# Patient Record
Sex: FEMALE | URBAN - METROPOLITAN AREA
[De-identification: names, ages, dates, MRNs, and addresses within clinical notes are randomized per-mention and may not be internally consistent; named-entity substitution may affect disease eponyms.]

---

## 2018-01-01 ENCOUNTER — HOSPITAL ENCOUNTER (EMERGENCY)
Facility: MEDICAL CENTER | Age: 38
End: 2018-06-12
Attending: EMERGENCY MEDICINE

## 2018-01-01 VITALS — BODY MASS INDEX: 25.71 KG/M2 | WEIGHT: 160 LBS | HEIGHT: 66 IN

## 2018-01-01 DIAGNOSIS — Z86.69 H/O TONIC-CLONIC SEIZURES: ICD-10-CM

## 2018-01-01 DIAGNOSIS — I46.9 CARDIAC ARREST (HCC): ICD-10-CM

## 2018-01-01 LAB — GLUCOSE BLD-MCNC: 357 MG/DL (ref 65–99)

## 2018-01-01 PROCEDURE — 99285 EMERGENCY DEPT VISIT HI MDM: CPT

## 2018-01-01 PROCEDURE — 31500 INSERT EMERGENCY AIRWAY: CPT

## 2018-01-01 PROCEDURE — 82962 GLUCOSE BLOOD TEST: CPT

## 2018-01-01 PROCEDURE — 92950 HEART/LUNG RESUSCITATION CPR: CPT

## 2018-01-01 ASSESSMENT — PAIN SCALES - GENERAL: PAINLEVEL_OUTOF10: 0

## 2018-06-13 NOTE — PROGRESS NOTES
1646 Patient arrives CPR in progress. Zoll pads placed.  Asystole on monitor, pulseless. CPR continued. Emesis and blood out of LPA. Patient suctioned.   1648 1 mg Epi administered. 18g IV placed in RAC. Blood obtained.  CPR Continued.   1650-Pulse Check.  Asystole on monitor. Patient remains pulseless. CPR resumed. 20 G placed in L hand.  1652-Pulse Check. Asystole on monitor. Pulseless.  CPR resumed.    1653-Patient intubated by ERP. 1 mg Epi administered.    1654-Cardiac ultrasound by ERP shows no cardiac activity.    1655-TOD 1655 by ERP Dr. Cadet.

## 2018-06-13 NOTE — RESPIRATORY CARE
Cardiopulmonary Resuscitation/Intubation Assist    Intubation assist performed yes  Reason for intubation cardiac arrest/ LMA in place  Positive Color Change on EZCap? yes    Difficult Intubation/Number of attempts 1  Evidence of aspiration yes    Patient coded in ER. MD called code after resuscitation attempted

## 2018-06-13 NOTE — ED TRIAGE NOTES
Chief Complaint   Patient presents with   • Cardiac Arrest     BIB [facility] for witnessed cardiac arrest post seizure at 1610.  CPR in progress 40 minutes PTA with epi*3 administered PTA. LPA in place upon arrival.  IO in place.     Patient hx of seizures around the time of her period.  Family denies drug/alcohol use.

## 2018-06-13 NOTE — ED PROVIDER NOTES
ED Provider Note    Scribed for Iggy Cadet M.D. by Joseph Garay. 6/12/2018, 5:07 PM.    Primary care provider: No primary care provider on file.  Means of arrival: EMS  History obtained from: EMS and family   History limited by: none     CHIEF COMPLAINT  No chief complaint on file.    HPI  Griselda Marelia Grajeda Godoy is a 37 y.o. female who presents to the Emergency Department after a seizure and loss of consciousness. According to the patients  he received a phone call at 1600 that his wife had a seizure from family and when he got home approximately 10 minutes later he began chest compressions and called EMS. EMS reported that they were on scene about 8 minutes later at 1618. En route they administered 3 rounds of epi and 2 narcaine and the patient remained unresponsive in asystole. En route her sugar was reported to be 354 and it was reported that she had no allergies and does not drink alcohol or use recreational drugs. Chest compressions were continued until EMS arrived at 1643 with the patient still in asystole (43 minutes at this point).     REVIEW OF SYSTEMS  Review of Systems   Unable to perform ROS: Patient unresponsive     PAST MEDICAL HISTORY   seizures,     SURGICAL HISTORY  patient denies any surgical history    SOCIAL HISTORY  Social History   Substance Use Topics   • Smoking status: Not on file   • Smokeless tobacco: Not on file   • Alcohol use Not on file      History   Drug use: Unknown       FAMILY HISTORY  No family history on file.    CURRENT MEDICATIONS  No current facility-administered medications on file prior to encounter.      No current outpatient prescriptions on file prior to encounter.     ALLERGIES  Allergies not on file    PHYSICAL EXAM  VITAL SIGNS: There were no vitals taken for this visit.    Constitutional: Obtunded, CPR in progress    HENT: Normocephalic, Atraumatic, Bilateral external ears normal, oropharynx moist, No oral exudates, Nose normal.   Eyes:  Pupils are fixed at 2 mm nonreactive  Neck: Trachea is midline  Lymphatic: No lymphadenopathy noted.   Cardiovascular: No heart tones are heard.  No pulses felt.  Thorax & Lungs: Patient is obtunded rhonchorous sounds throughout after intubation do have good breath sounds but still rhonchorous.  No signs of trauma   abdomen: Soft no signs of trauma  Skin: Cool cyanotic  Musculoskeletal: Flaccid no clubbing or edema  Neurologic: Obtunded flaccid  Psychiatric: Patient is obtunded    COURSE & MEDICAL DECISION MAKING  Pertinent Labs & Imaging studies reviewed. (See chart for details)    4:45 PM - Patient seen and examined at bedside.  CPR is in progress and continued by myself.    4:49 PM Pulse check asystole.  Epinephrine given.  Airway was then suctioned and resecured with 7.5 ET tube    4:51 PM Pulse check asystole.  Epinephrine given    4:53 Pulse check asystole.  Epinephrine given    4:56 pulse check, asystole.  Bedside ultrasonography shows no cardiac wall motion cease of efforts     Decision Making:  Patient presents initially there is an LMA in place there is a significant amount of vomitus coming from outside of the L and a expressed relief valve.  I did remove the LMA while CPR is in progress and continued with ACLS protocol as described above.  Patient remained asystolic but I did remove the LMA airway there is a significant amount of food material within the posterior oropharynx as well as around the vocal cords.  This was suctioned out and I used a 7.5 ET tube under direct visualization placed in the area.  Continued CPR and and respiratory effort with bag valve through endotracheal intubation.  2 more doses of epinephrine were given the patient remained asystolic.  At this point bedside ultrasonography should absolutely no cardiac wall motion and I feel it at 1656 there is no signs of life and I called the code.  I then spoke with the family about this and obtain some additional details apparently the  "patient has been having seizures but there has been apparently no medications no treatment for it sounds \"hormonal seizures whenever she has her menstrual cycle she will then have a seizure.  I do not know what the workup was in the past.  But apparently the patient has been on no antiepileptic medications.  At this time I do not have a direct cause for the patient's cardiac arrest it is possible the patient may have had a seizure than an obstructed airway and was not recognized for approximately 10 minutes when the  was called he apparently came back to the wife she is on the floor cyanotic he started immediately CPR but apparently the patient had been laying there for well over 10 minutes.  Upon arrival the ambulance the patient was asystolic in no pulses they did do 3 rounds of epinephrine and brought the patient to the ED total down time from the time of initial call was well over 45 minutes.        FINAL IMPRESSION  1. Cardiac arrest (HCC)    2. H/O tonic-clonic seizures          IJoseph (Ananyaibe), am scribing for, and in the presence of, Iggy Cadet M.D..    Electronically signed by: Joseph Garay (Abi), 6/12/2018    IIggy M.D. personally performed the services described in this documentation, as scribed by Joseph Garay in my presence, and it is both accurate and complete.    The note accurately reflects work and decisions made by me.  Iggy Cadet  6/12/2018  11:18 PM      "

## 2018-06-13 NOTE — DISCHARGE PLANNING
SW responded to Cardiac Arrest call.    Pt arrived 37 year old female -Griselda Godoy    Family arrived - Yuniel  [phone] significant other and her sister Gillian        Pt TOD [id number]  Emotional support given to family. Yuniel states that he and Pt have 2 boys 10 and 12 years old.  Child Life Specialist Eliza came and spoke to family regarding speaking to children about the death.  Resources given to family.  Difficult time Brochure with mortuary choices given to family.    Medical Examiner in to speak with family.